# Patient Record
Sex: FEMALE | URBAN - METROPOLITAN AREA
[De-identification: names, ages, dates, MRNs, and addresses within clinical notes are randomized per-mention and may not be internally consistent; named-entity substitution may affect disease eponyms.]

---

## 2017-04-14 ENCOUNTER — APPOINTMENT (RX ONLY)
Dept: URBAN - METROPOLITAN AREA CLINIC 116 | Facility: CLINIC | Age: 27
Setting detail: DERMATOLOGY
End: 2017-04-14

## 2017-04-14 DIAGNOSIS — L70.0 ACNE VULGARIS: ICD-10-CM | Status: UNCHANGED

## 2017-04-14 PROCEDURE — ? TREATMENT REGIMEN

## 2017-04-14 PROCEDURE — 99213 OFFICE O/P EST LOW 20 MIN: CPT

## 2017-04-14 PROCEDURE — ? PRESCRIPTION

## 2017-04-14 PROCEDURE — ? COUNSELING

## 2017-04-14 RX ORDER — TRETINOIN 0.1 MG/G
GEL TOPICAL
Qty: 1 | Refills: 0 | Status: ERX | COMMUNITY
Start: 2017-04-14

## 2017-04-14 RX ORDER — MINOCYCLINE HYDROCHLORIDE 50 MG/1
CAPSULE, COATED PELLETS ORAL
Qty: 60 | Refills: 0 | Status: ERX | COMMUNITY
Start: 2017-04-14

## 2017-04-14 RX ADMIN — MINOCYCLINE HYDROCHLORIDE: 50 CAPSULE, COATED PELLETS ORAL at 13:54

## 2017-04-14 RX ADMIN — TRETINOIN: 0.1 GEL TOPICAL at 13:57

## 2017-04-14 ASSESSMENT — LOCATION DETAILED DESCRIPTION DERM: LOCATION DETAILED: LEFT INFERIOR CENTRAL MALAR CHEEK

## 2017-04-14 ASSESSMENT — LOCATION SIMPLE DESCRIPTION DERM: LOCATION SIMPLE: LEFT CHEEK

## 2017-04-14 ASSESSMENT — LOCATION ZONE DERM: LOCATION ZONE: FACE

## 2017-05-01 ENCOUNTER — RX ONLY (OUTPATIENT)
Age: 27
Setting detail: RX ONLY
End: 2017-05-01

## 2017-05-01 RX ORDER — TRETINOIN 0.25 MG/G
CREAM TOPICAL
Qty: 1 | Refills: 0 | Status: ERX | COMMUNITY
Start: 2017-05-01

## 2019-06-29 NOTE — PROCEDURE: TREATMENT REGIMEN
Detail Level: Zone
Discontinue Regimen: Ampicillin\\nClindamycin lotion\\nFinacea gel\\nMoisturizer
DISCHARGE

## 2021-06-17 ENCOUNTER — APPOINTMENT (OUTPATIENT)
Dept: OPHTHALMOLOGY | Facility: CLINIC | Age: 31
End: 2021-06-17

## 2021-06-17 PROBLEM — Z00.00 ENCOUNTER FOR PREVENTIVE HEALTH EXAMINATION: Status: ACTIVE | Noted: 2021-06-17
